# Patient Record
Sex: MALE | Race: WHITE | NOT HISPANIC OR LATINO
[De-identification: names, ages, dates, MRNs, and addresses within clinical notes are randomized per-mention and may not be internally consistent; named-entity substitution may affect disease eponyms.]

---

## 2021-03-16 PROBLEM — Z00.00 ENCOUNTER FOR PREVENTIVE HEALTH EXAMINATION: Status: ACTIVE | Noted: 2021-03-16

## 2021-03-22 ENCOUNTER — APPOINTMENT (OUTPATIENT)
Dept: SURGERY | Facility: CLINIC | Age: 46
End: 2021-03-22
Payer: COMMERCIAL

## 2021-03-22 VITALS
HEART RATE: 65 BPM | SYSTOLIC BLOOD PRESSURE: 124 MMHG | HEIGHT: 74 IN | WEIGHT: 250 LBS | BODY MASS INDEX: 32.08 KG/M2 | DIASTOLIC BLOOD PRESSURE: 82 MMHG

## 2021-03-22 DIAGNOSIS — Z78.9 OTHER SPECIFIED HEALTH STATUS: ICD-10-CM

## 2021-03-22 DIAGNOSIS — Z86.79 PERSONAL HISTORY OF OTHER DISEASES OF THE CIRCULATORY SYSTEM: ICD-10-CM

## 2021-03-22 DIAGNOSIS — F17.210 NICOTINE DEPENDENCE, CIGARETTES, UNCOMPLICATED: ICD-10-CM

## 2021-03-22 DIAGNOSIS — Z86.39 PERSONAL HISTORY OF OTHER ENDOCRINE, NUTRITIONAL AND METABOLIC DISEASE: ICD-10-CM

## 2021-03-22 DIAGNOSIS — K61.1 RECTAL ABSCESS: ICD-10-CM

## 2021-03-22 PROCEDURE — 99072 ADDL SUPL MATRL&STAF TM PHE: CPT

## 2021-03-22 PROCEDURE — 99243 OFF/OP CNSLTJ NEW/EST LOW 30: CPT

## 2021-03-22 NOTE — HISTORY OF PRESENT ILLNESS
[de-identified] : This is a 46 year  old patient who was referred by Dr. Venancio Wells with the chief complaint of having recurrent pilonidal cyst .  He reports having this condition for more than 20 years.  He denies any trauma to the area.   He denies any fever or  night sweats. Appetite is good and weight is stable.  He states that the he had Pilonidal cyst drained 20 years ago. he states that 1.5 month ago the cyst came back. he was prescribed antibiotics and it got better.  2 weeks ago  it got inflamed again and he had it I&d'd  He wants to know if it could  be surgically  removed.\par

## 2021-03-22 NOTE — PLAN
[FreeTextEntry1] : Mr. RICHARDS  was told significance of findings, options, risks and benefits were explained.    All surgical options were discussed including non-surgical treatments.  he was advised to see a colorectal surgeon. \par Patient advised to seek immediate medical attention with any acute change in symptoms or with the development of any new or worsening symptoms.  Patient's questions and concerns addressed to patient's satisfaction, and patient verbalized an understanding of the information discussed.\par \par

## 2021-03-22 NOTE — ASSESSMENT
[FreeTextEntry1] : Impression: PAtient is here with cc of recurrent pilonidal cyst. there is no sinuses that would suggest that he has a pilonidal cyst. he might have talita-rectal abscess. I was  unable to see the area where he had I&D.  no active infection

## 2021-03-22 NOTE — PHYSICAL EXAM
[Pilonidal Cyst] : no pilonidal cysts [External Hemorrhoid] : an external hemorrhoid was present [Skin Tags] : residual hemorrhoidal skin tags were noted [None] : there was no rectal mass  [de-identified] : He  is alert, well-groomed, and in NAD\par   [de-identified] : anicteric.  Nasal mucosa pink, septum midline. Oral mucosa pink.  Tongue midline, Pharynx without exudates.\par   [de-identified] : Neck supple. Trachea midline. Thyroid isthmus barely palpable, lobes not felt.  [de-identified] : unable to see the area where he had I&D

## 2021-03-22 NOTE — CONSULT LETTER
[Dear  ___] : Dear  [unfilled], [Consult Letter:] : I had the pleasure of evaluating your patient, [unfilled]. [Please see my note below.] : Please see my note below. [Consult Closing:] : Thank you very much for allowing me to participate in the care of this patient.  If you have any questions, please do not hesitate to contact me. [Sincerely,] : Sincerely, [FreeTextEntry3] : Dick Pacheco MD, FACS

## 2021-09-16 ENCOUNTER — APPOINTMENT (OUTPATIENT)
Dept: SURGERY | Facility: CLINIC | Age: 46
End: 2021-09-16
Payer: COMMERCIAL

## 2021-09-16 VITALS
SYSTOLIC BLOOD PRESSURE: 128 MMHG | HEART RATE: 69 BPM | BODY MASS INDEX: 32.08 KG/M2 | HEIGHT: 74 IN | WEIGHT: 250 LBS | OXYGEN SATURATION: 99 % | DIASTOLIC BLOOD PRESSURE: 77 MMHG

## 2021-09-16 VITALS — TEMPERATURE: 96.8 F

## 2021-09-16 DIAGNOSIS — K64.4 RESIDUAL HEMORRHOIDAL SKIN TAGS: ICD-10-CM

## 2021-09-16 DIAGNOSIS — L98.8 OTHER SPECIFIED DISORDERS OF THE SKIN AND SUBCUTANEOUS TISSUE: ICD-10-CM

## 2021-09-16 PROCEDURE — 99213 OFFICE O/P EST LOW 20 MIN: CPT | Mod: 25

## 2021-09-16 PROCEDURE — 46600 DIAGNOSTIC ANOSCOPY SPX: CPT

## 2021-09-16 RX ORDER — ROSUVASTATIN CALCIUM 20 MG/1
20 TABLET, FILM COATED ORAL
Qty: 90 | Refills: 0 | Status: ACTIVE | COMMUNITY
Start: 2021-03-16

## 2021-09-16 RX ORDER — HYDROCODONE BITARTRATE AND ACETAMINOPHEN 5; 325 MG/1; MG/1
5-325 TABLET ORAL
Qty: 9 | Refills: 0 | Status: COMPLETED | COMMUNITY
Start: 2021-03-02 | End: 2021-09-16

## 2021-09-16 RX ORDER — SULFAMETHOXAZOLE AND TRIMETHOPRIM 800; 160 MG/1; MG/1
800-160 TABLET ORAL
Qty: 20 | Refills: 0 | Status: COMPLETED | COMMUNITY
Start: 2021-03-02 | End: 2021-09-16

## 2021-09-16 RX ORDER — LOSARTAN POTASSIUM 100 MG/1
100 TABLET, FILM COATED ORAL
Qty: 90 | Refills: 0 | Status: ACTIVE | COMMUNITY
Start: 2021-03-16

## 2021-09-22 PROBLEM — K64.4 HEMORRHOIDS, EXTERNAL: Status: ACTIVE | Noted: 2021-09-22

## 2021-09-22 PROBLEM — L98.8 PILONIDAL DISEASE: Status: ACTIVE | Noted: 2021-09-22

## 2021-09-22 NOTE — HISTORY OF PRESENT ILLNESS
[FreeTextEntry1] : Mr. Keo Zaldivar is a 46y.o. M presenting for possible pilonidal disease. He reports that he first developed issues with pilonidal disease around the age of 22 and at that time it was actually on his tailbone. However, now the area bothering him is lower near his cleft and he has had 3 episodes this year. The first 2 episodes were managed w/ an I&D and the last episode, which occurred last week, drained spontaneously while at work. He also reports 2 anal skin tags and some internal hemorrhoids that bother him. He has daily BMs which vary in softness and need for straining depending on his diet. He is not on a fiber supplement or bowel regimen. Of note, his last colonoscopy was in 2006 which he had for bleeding at which time 2 benign polyps were found.

## 2021-09-22 NOTE — PHYSICAL EXAM
[Normal] : was normal [None] : there was no rectal mass  [Normal Breath Sounds] : Normal breath sounds [Normal Heart Sounds] : normal heart sounds [Normal Rate and Rhythm] : normal rate and rhythm [Alert] : alert [Oriented to Person] : oriented to person [Oriented to Place] : oriented to place [Oriented to Time] : oriented to time [Tender, Swollen] : nontender, non-swollen [Thrombosed] : that was not thrombosed [Gross Blood] : no gross blood [JVD] : no jugular venous distention  [Wheezing] : no wheezing was heard [de-identified] : soft, non-distended, non-tender to palpation [de-identified] : area at the top of the cleft towards the right side w/ some induration and erythema w/o fluctuance, no sinuses or pits typical for pilonidal seen, large external hemorrhoids w/o inflammation [de-identified] : awake, alert, in NAD [de-identified] : normocephalic, atraumatic, EOMI, nl conjunctiva [de-identified] : deferred [de-identified] : b/l chest rise, EWOB on RA [de-identified] : normal strength [de-identified] : perianal skin as above [de-identified] : normal mood and affect

## 2021-09-22 NOTE — ASSESSMENT
[FreeTextEntry1] : 46y.o. M w/ large external hemorrhoids and possible pilonidal disease vs abscess cavity.

## 2021-09-22 NOTE — PROCEDURE
[FreeTextEntry1] : Anoscopy - performed with small lighted disposable anoscope, unremarkable internal hemorrhoids noted, large non-inflamed external hemorrhoids (all 3 columns though LL and RA largest ones)

## 2021-11-12 DIAGNOSIS — Z01.818 ENCOUNTER FOR OTHER PREPROCEDURAL EXAMINATION: ICD-10-CM

## 2021-11-19 ENCOUNTER — OUTPATIENT (OUTPATIENT)
Dept: OUTPATIENT SERVICES | Facility: HOSPITAL | Age: 46
LOS: 1 days | End: 2021-11-19
Payer: COMMERCIAL

## 2021-11-19 ENCOUNTER — APPOINTMENT (OUTPATIENT)
Dept: DISASTER EMERGENCY | Facility: CLINIC | Age: 46
End: 2021-11-19

## 2021-11-19 VITALS
SYSTOLIC BLOOD PRESSURE: 143 MMHG | OXYGEN SATURATION: 100 % | RESPIRATION RATE: 16 BRPM | WEIGHT: 244.93 LBS | TEMPERATURE: 99 F | HEART RATE: 85 BPM | DIASTOLIC BLOOD PRESSURE: 86 MMHG | HEIGHT: 74 IN

## 2021-11-19 DIAGNOSIS — L98.8 OTHER SPECIFIED DISORDERS OF THE SKIN AND SUBCUTANEOUS TISSUE: ICD-10-CM

## 2021-11-19 DIAGNOSIS — K64.4 RESIDUAL HEMORRHOIDAL SKIN TAGS: ICD-10-CM

## 2021-11-19 DIAGNOSIS — Z29.9 ENCOUNTER FOR PROPHYLACTIC MEASURES, UNSPECIFIED: ICD-10-CM

## 2021-11-19 DIAGNOSIS — I10 ESSENTIAL (PRIMARY) HYPERTENSION: ICD-10-CM

## 2021-11-19 DIAGNOSIS — S13.160A: Chronic | ICD-10-CM

## 2021-11-19 DIAGNOSIS — Z01.818 ENCOUNTER FOR OTHER PREPROCEDURAL EXAMINATION: ICD-10-CM

## 2021-11-19 DIAGNOSIS — G47.33 OBSTRUCTIVE SLEEP APNEA (ADULT) (PEDIATRIC): ICD-10-CM

## 2021-11-19 LAB — SARS-COV-2 N GENE NPH QL NAA+PROBE: NOT DETECTED

## 2021-11-19 PROCEDURE — G0463: CPT

## 2021-11-19 PROCEDURE — 93005 ELECTROCARDIOGRAM TRACING: CPT

## 2021-11-19 RX ORDER — LOSARTAN POTASSIUM 100 MG/1
1 TABLET, FILM COATED ORAL
Qty: 0 | Refills: 0 | DISCHARGE

## 2021-11-19 NOTE — H&P PST ADULT - NSICDXPASTMEDICALHX_GEN_ALL_CORE_FT
PAST MEDICAL HISTORY:  HLD (hyperlipidemia)     HTN (hypertension)     Other specified disorders of the skin and subcutaneous tissue     Residual hemorrhoidal skin tags

## 2021-11-19 NOTE — H&P PST ADULT - PROBLEM SELECTOR PLAN 3
Pt denies sleep apnea or sleep studies. Stop Bang Score =3 Pt will be monitor for patent airway pre and post procedure

## 2021-11-19 NOTE — H&P PST ADULT - ATTENDING COMMENTS
OR today for EUA, pilonidal cystectomy, excisional hemorrhoidectomy  Prone jackknife  MAC for anesthesia  0.5% marcaine and exparel 1:1    Rolanda Godwin MD  Attending Physician

## 2021-11-19 NOTE — H&P PST ADULT - ASSESSMENT
46 yr old male with history of HTN, HLD presents with hemorrhoid and pilonidal cyst. Pt had the cyst and hemorrhoids for sometime. Pt had an I&D by his primary and the cyst returned within 2 months in October. Pt was referred to surgeon for evaluation. Pt schedule for exam under anesthesia excisional hemorrhoidectomy pilonidal cystectomy on 11/22/2021.

## 2021-11-19 NOTE — H&P PST ADULT - HISTORY OF PRESENT ILLNESS
46 yr old male with history of HTN, HLD, presents with hemorrhoids and pilonidal cyst for some time. Pt had PMD and had the cyst I&D July 2021. Pt states the cyst returned two months later and sent to surgeon for consultation. Pt schedule for exam under anesthesia excisional hemorrhoidectomy pilonidal cystectomy on 11/22/2021. Pt instructed on the chlorhexidene wash am of surgery. Written instructions provided verbalized understanding. Pt denies any sleep apnea or sleep studies.

## 2021-11-21 ENCOUNTER — TRANSCRIPTION ENCOUNTER (OUTPATIENT)
Age: 46
End: 2021-11-21

## 2021-11-22 ENCOUNTER — APPOINTMENT (OUTPATIENT)
Dept: SURGERY | Facility: HOSPITAL | Age: 46
End: 2021-11-22
Payer: COMMERCIAL

## 2021-11-22 ENCOUNTER — OUTPATIENT (OUTPATIENT)
Dept: OUTPATIENT SERVICES | Facility: HOSPITAL | Age: 46
LOS: 1 days | End: 2021-11-22
Payer: COMMERCIAL

## 2021-11-22 ENCOUNTER — RESULT REVIEW (OUTPATIENT)
Age: 46
End: 2021-11-22

## 2021-11-22 VITALS
HEART RATE: 85 BPM | RESPIRATION RATE: 16 BRPM | SYSTOLIC BLOOD PRESSURE: 105 MMHG | TEMPERATURE: 99 F | OXYGEN SATURATION: 98 % | WEIGHT: 244.93 LBS | DIASTOLIC BLOOD PRESSURE: 75 MMHG | HEIGHT: 74 IN

## 2021-11-22 VITALS
RESPIRATION RATE: 17 BRPM | DIASTOLIC BLOOD PRESSURE: 75 MMHG | OXYGEN SATURATION: 98 % | HEART RATE: 68 BPM | TEMPERATURE: 98 F | SYSTOLIC BLOOD PRESSURE: 122 MMHG

## 2021-11-22 DIAGNOSIS — K64.4 RESIDUAL HEMORRHOIDAL SKIN TAGS: ICD-10-CM

## 2021-11-22 DIAGNOSIS — L98.8 OTHER SPECIFIED DISORDERS OF THE SKIN AND SUBCUTANEOUS TISSUE: ICD-10-CM

## 2021-11-22 DIAGNOSIS — S13.160A: Chronic | ICD-10-CM

## 2021-11-22 PROCEDURE — 11770 EXC PILONIDAL CYST SIMPLE: CPT

## 2021-11-22 PROCEDURE — 88304 TISSUE EXAM BY PATHOLOGIST: CPT | Mod: 26

## 2021-11-22 PROCEDURE — 88304 TISSUE EXAM BY PATHOLOGIST: CPT

## 2021-11-22 PROCEDURE — 46260 REMOVE IN/EX HEM GROUPS 2+: CPT

## 2021-11-22 PROCEDURE — 46250 REMOVE EXT HEM GROUPS 2+: CPT

## 2021-11-22 RX ORDER — LOSARTAN POTASSIUM 100 MG/1
1 TABLET, FILM COATED ORAL
Qty: 0 | Refills: 0 | DISCHARGE

## 2021-11-22 RX ORDER — FENTANYL CITRATE 50 UG/ML
50 INJECTION INTRAVENOUS
Refills: 0 | Status: DISCONTINUED | OUTPATIENT
Start: 2021-11-22 | End: 2021-11-22

## 2021-11-22 RX ORDER — SODIUM CHLORIDE 9 MG/ML
1000 INJECTION, SOLUTION INTRAVENOUS
Refills: 0 | Status: DISCONTINUED | OUTPATIENT
Start: 2021-11-22 | End: 2021-11-22

## 2021-11-22 RX ORDER — SODIUM CHLORIDE 9 MG/ML
3 INJECTION INTRAMUSCULAR; INTRAVENOUS; SUBCUTANEOUS EVERY 8 HOURS
Refills: 0 | Status: DISCONTINUED | OUTPATIENT
Start: 2021-11-22 | End: 2021-11-22

## 2021-11-22 RX ORDER — OXYCODONE AND ACETAMINOPHEN 5; 325 MG/1; MG/1
1 TABLET ORAL ONCE
Refills: 0 | Status: DISCONTINUED | OUTPATIENT
Start: 2021-11-22 | End: 2021-11-22

## 2021-11-22 RX ORDER — OXYCODONE HYDROCHLORIDE 5 MG/1
1 TABLET ORAL
Qty: 12 | Refills: 0
Start: 2021-11-22

## 2021-11-22 RX ORDER — FENTANYL CITRATE 50 UG/ML
25 INJECTION INTRAVENOUS
Refills: 0 | Status: DISCONTINUED | OUTPATIENT
Start: 2021-11-22 | End: 2021-11-22

## 2021-11-22 RX ORDER — ROSUVASTATIN CALCIUM 5 MG/1
100 TABLET ORAL
Qty: 0 | Refills: 0 | DISCHARGE

## 2021-11-22 RX ADMIN — FENTANYL CITRATE 25 MICROGRAM(S): 50 INJECTION INTRAVENOUS at 13:20

## 2021-11-22 NOTE — BRIEF OPERATIVE NOTE - NSICDXBRIEFPREOP_GEN_ALL_CORE_FT
PRE-OP DIAGNOSIS:  Hemorrhoids 22-Nov-2021 12:39:59  Mayelin Weiner  Pilonidal cyst 22-Nov-2021 12:40:07  Mayelin Weiner

## 2021-11-22 NOTE — ASU DISCHARGE PLAN (ADULT/PEDIATRIC) - CARE PROVIDER_API CALL
Rolanda Godwin)  Surgery  [address] [facility] [address], [address]  [place] [address]  Phone: [phone]  Fax: [phone]  Follow Up Time: 2 weeks

## 2021-11-22 NOTE — BRIEF OPERATIVE NOTE - NSICDXBRIEFPOSTOP_GEN_ALL_CORE_FT
POST-OP DIAGNOSIS:  Pilonidal cyst 22-Nov-2021 12:40:19  Mayelin Weiner  Hemorrhoids 22-Nov-2021 12:40:12  Mayelin Weiner

## 2021-11-22 NOTE — BRIEF OPERATIVE NOTE - NSICDXBRIEFPROCEDURE_GEN_ALL_CORE_FT
PROCEDURES:  Excision, pilonidal sinus, simple 22-Nov-2021 12:38:22  Mayelin Weiner  External hemorrhoidectomy involving two or more anal columns 22-Nov-2021 12:39:44  Mayelin Weiner

## 2021-11-24 PROBLEM — K64.4 RESIDUAL HEMORRHOIDAL SKIN TAGS: Chronic | Status: ACTIVE | Noted: 2021-11-19

## 2021-11-24 PROBLEM — L98.8 OTHER SPECIFIED DISORDERS OF THE SKIN AND SUBCUTANEOUS TISSUE: Chronic | Status: ACTIVE | Noted: 2021-11-19

## 2021-11-24 PROBLEM — I10 ESSENTIAL (PRIMARY) HYPERTENSION: Chronic | Status: ACTIVE | Noted: 2021-11-19

## 2021-11-24 PROBLEM — E78.5 HYPERLIPIDEMIA, UNSPECIFIED: Chronic | Status: ACTIVE | Noted: 2021-11-19

## 2021-11-30 LAB — SURGICAL PATHOLOGY STUDY: SIGNIFICANT CHANGE UP

## 2021-12-02 ENCOUNTER — APPOINTMENT (OUTPATIENT)
Dept: SURGERY | Facility: CLINIC | Age: 46
End: 2021-12-02
Payer: COMMERCIAL

## 2021-12-02 VITALS
SYSTOLIC BLOOD PRESSURE: 136 MMHG | BODY MASS INDEX: 31.44 KG/M2 | DIASTOLIC BLOOD PRESSURE: 76 MMHG | HEIGHT: 74 IN | WEIGHT: 245 LBS | HEART RATE: 96 BPM

## 2021-12-02 VITALS — TEMPERATURE: 96.7 F

## 2021-12-02 PROCEDURE — 99024 POSTOP FOLLOW-UP VISIT: CPT

## 2021-12-22 NOTE — ASSESSMENT
[FreeTextEntry1] : 46y.o. M w/ pilonidal disease and large symptomatic external hemorrhoids now s/p EUA, pilonidal cystectomy and excisional hemorrhoidectomy (x3) on 11/22/21 presenting for a post-op visit.

## 2021-12-22 NOTE — HISTORY OF PRESENT ILLNESS
[FreeTextEntry1] : Mr. Keo Zaldivar is a 46y.o. M presenting for a post-op visit. He first developed issues with pilonidal disease around the age of 22 and at that time it was actually on his tailbone. However, now the area bothering him is lower near his cleft and he has had 3 episodes this year. The first 2 episodes were managed w/ an I&D and the last episode, which occurred last week, drained spontaneously while at work. He also reports 2 anal skin tags and some internal hemorrhoids that bother him. He has daily BMs which vary in softness and need for straining depending on his diet. He is not on a fiber supplement or bowel regimen. Of note, his last colonoscopy was in 2006 which he had for bleeding at which time 2 benign polyps were found. Took him to the OR on 11/22/21 for an EUA, pilonidal cystectomy and excisional hemorrhoidectomy (x3). Today he reports that he is starting to have some improvement in his pain and is not having much drainage. He denies any fevers, chills, issues with urination or issues with BMs.

## 2021-12-22 NOTE — PHYSICAL EXAM
[Normal Rate and Rhythm] : normal rate and rhythm [Alert] : alert [Oriented to Person] : oriented to person [Oriented to Place] : oriented to place [Oriented to Time] : oriented to time [Excoriation] : no perianal excoriation [Gross Blood] : no gross blood [JVD] : no jugular venous distention  [de-identified] : soft, non-distended, non-tender to palpation [de-identified] : excision sites healing well w/ expected drainage, vicryl visible (left in place to fall out and dissolve) [de-identified] : awake, alert, in NAD [de-identified] : normocephalic, atraumatic, EOMI, nl conjunctiva [de-identified] : b/l chest rise, EWOB on RA [de-identified] : deferred [de-identified] : normal strength [de-identified] : perianal skin as above [de-identified] : normal mood and affect

## 2023-03-21 NOTE — ASU PREOP CHECKLIST - BOWEL PREP
Do You Have A Family History Of Psoriasis?: no
Have You Been Diagnosed With Psoriatic Arthritis?: yes
Where Is Your Psoriasis Located?: Body
n/a

## 2023-05-22 NOTE — H&P PST ADULT - RS GEN PE MLT RESP DETAILS PC
normal/airway patent/breath sounds equal/good air movement/respirations non-labored Erythromycin Counseling:  I discussed with the patient the risks of erythromycin including but not limited to GI upset, allergic reaction, drug rash, diarrhea, increase in liver enzymes, and yeast infections.

## 2023-08-09 NOTE — ASU PATIENT PROFILE, ADULT - PT NEEDS ASSIST
Notified patient of results and is agreeable to plan.     Script for Metformin 500 mg BID sent to [facility].    He is aware to start medication after scheduled procedure.    no

## 2024-09-27 ENCOUNTER — APPOINTMENT (OUTPATIENT)
Dept: ORTHOPEDIC SURGERY | Facility: CLINIC | Age: 49
End: 2024-09-27
Payer: COMMERCIAL

## 2024-09-27 DIAGNOSIS — Z00.00 ENCOUNTER FOR GENERAL ADULT MEDICAL EXAMINATION W/OUT ABNORMAL FINDINGS: ICD-10-CM

## 2024-09-27 DIAGNOSIS — M51.36 OTHER INTERVERTEBRAL DISC DEGENERATION, LUMBAR REGION: ICD-10-CM

## 2024-09-27 PROCEDURE — 99204 OFFICE O/P NEW MOD 45 MIN: CPT

## 2024-09-27 PROCEDURE — 72170 X-RAY EXAM OF PELVIS: CPT

## 2024-09-27 PROCEDURE — 72110 X-RAY EXAM L-2 SPINE 4/>VWS: CPT

## 2024-09-27 RX ORDER — METHYLPREDNISOLONE 4 MG/1
4 TABLET ORAL
Qty: 1 | Refills: 0 | Status: ACTIVE | COMMUNITY
Start: 2024-09-27 | End: 1900-01-01

## 2024-09-27 RX ORDER — MELOXICAM 15 MG/1
15 TABLET ORAL
Qty: 30 | Refills: 0 | Status: ACTIVE | COMMUNITY
Start: 2024-09-27 | End: 1900-01-01

## 2024-09-27 RX ORDER — CYCLOBENZAPRINE HYDROCHLORIDE 5 MG/1
5 TABLET, FILM COATED ORAL
Qty: 45 | Refills: 1 | Status: ACTIVE | COMMUNITY
Start: 2024-09-27 | End: 1900-01-01

## 2024-09-27 NOTE — IMAGING
[de-identified] : LSPINE Inspection: No rash or ecchymosis Palpation: No tenderness to palpation or spasm in bilateral thoracic and lumbar paraspinal musculature, no SI joint tenderness to palpation ROM: Full with stiffness Strength: 5/5 bilateral hip flexors, knee extensors, ankle dorsiflexors, EHL, ankle plantarflexors Sensation: Sensation present to light touch bilateral L2-S1 distributions Provocative maneuvers: Negative bilateral straight leg raise [Facet arthropathy] : Facet arthropathy [Disc space narrowing] : Disc space narrowing [AP] : anteroposterior [There are no fractures, subluxations or dislocations. No significant abnormalities are seen] : There are no fractures, subluxations or dislocations. No significant abnormalities are seen

## 2024-09-27 NOTE — ASSESSMENT
[FreeTextEntry1] : MDP NSAIDs- Patient warned of risk of medication to GI tract, increased blood pressure, cardiac risk, and risk of fluid retention.  Advised to clear medication with internist or PCP if any concurrent health problem with heart, blood pressure, or GI system exists. Muscle Relaxants- To help decrease muscle spasm and assist with pain relief. Advised of sedating effects and instructed not to drive, operate heavy machinery, or take with other sedating medications. PT, meds  Will discuss MRI

## 2024-09-27 NOTE — HISTORY OF PRESENT ILLNESS
[de-identified] : 49-year-old male presents today with complaints of sharp lower back. Patient denies acute trauma or injury. Has been taking aleve, performing HEP, utilizing warm compresses and resting with some improvement. Patient reports symptoms flare with activity.  Denies radicular pain, n/t. Denies change in bb function. Patient was evaluated for low back pain in past by Dr. Morataya in May 2021, treatment with PT.   PmHx: HTN NKDA

## 2024-10-24 ENCOUNTER — APPOINTMENT (OUTPATIENT)
Dept: ORTHOPEDIC SURGERY | Facility: CLINIC | Age: 49
End: 2024-10-24
Payer: COMMERCIAL

## 2024-10-24 DIAGNOSIS — M54.16 RADICULOPATHY, LUMBAR REGION: ICD-10-CM

## 2024-10-24 PROCEDURE — 99215 OFFICE O/P EST HI 40 MIN: CPT

## 2024-10-24 RX ORDER — GABAPENTIN 100 MG/1
100 CAPSULE ORAL
Qty: 60 | Refills: 2 | Status: ACTIVE | COMMUNITY
Start: 2024-10-24 | End: 1900-01-01

## 2024-11-01 ENCOUNTER — APPOINTMENT (OUTPATIENT)
Dept: MRI IMAGING | Facility: CLINIC | Age: 49
End: 2024-11-01

## 2024-11-13 ENCOUNTER — APPOINTMENT (OUTPATIENT)
Dept: ORTHOPEDIC SURGERY | Facility: CLINIC | Age: 49
End: 2024-11-13

## 2024-11-14 ENCOUNTER — APPOINTMENT (OUTPATIENT)
Dept: ORTHOPEDIC SURGERY | Facility: CLINIC | Age: 49
End: 2024-11-14
Payer: COMMERCIAL

## 2024-11-14 DIAGNOSIS — M54.16 RADICULOPATHY, LUMBAR REGION: ICD-10-CM

## 2024-11-14 PROCEDURE — 99214 OFFICE O/P EST MOD 30 MIN: CPT

## 2024-12-19 ENCOUNTER — APPOINTMENT (OUTPATIENT)
Dept: ORTHOPEDIC SURGERY | Facility: CLINIC | Age: 49
End: 2024-12-19
Payer: COMMERCIAL

## 2024-12-19 PROCEDURE — 99215 OFFICE O/P EST HI 40 MIN: CPT

## 2024-12-27 PROBLEM — M51.369 DDD (DEGENERATIVE DISC DISEASE), LUMBAR: Status: ACTIVE | Noted: 2024-09-27

## 2024-12-30 ENCOUNTER — APPOINTMENT (OUTPATIENT)
Dept: PAIN MANAGEMENT | Facility: CLINIC | Age: 49
End: 2024-12-30
Payer: OTHER MISCELLANEOUS

## 2025-01-07 ENCOUNTER — APPOINTMENT (OUTPATIENT)
Dept: PAIN MANAGEMENT | Facility: CLINIC | Age: 50
End: 2025-01-07
Payer: OTHER MISCELLANEOUS

## 2025-01-07 VITALS — BODY MASS INDEX: 33.88 KG/M2 | WEIGHT: 264 LBS | HEIGHT: 74 IN

## 2025-01-07 DIAGNOSIS — M51.369: ICD-10-CM

## 2025-01-07 DIAGNOSIS — M54.16 RADICULOPATHY, LUMBAR REGION: ICD-10-CM

## 2025-01-07 PROCEDURE — 99204 OFFICE O/P NEW MOD 45 MIN: CPT

## 2025-04-08 ENCOUNTER — APPOINTMENT (OUTPATIENT)
Dept: PAIN MANAGEMENT | Facility: CLINIC | Age: 50
End: 2025-04-08
Payer: OTHER MISCELLANEOUS

## 2025-04-08 PROCEDURE — 62323 NJX INTERLAMINAR LMBR/SAC: CPT

## 2025-04-21 ENCOUNTER — APPOINTMENT (OUTPATIENT)
Dept: PAIN MANAGEMENT | Facility: CLINIC | Age: 50
End: 2025-04-21
Payer: OTHER MISCELLANEOUS

## 2025-04-21 VITALS — WEIGHT: 253 LBS | HEIGHT: 74 IN | BODY MASS INDEX: 32.47 KG/M2

## 2025-04-21 DIAGNOSIS — M51.369: ICD-10-CM

## 2025-04-21 DIAGNOSIS — M54.16 RADICULOPATHY, LUMBAR REGION: ICD-10-CM

## 2025-04-21 PROCEDURE — 99214 OFFICE O/P EST MOD 30 MIN: CPT

## 2025-05-29 ENCOUNTER — APPOINTMENT (OUTPATIENT)
Dept: ORTHOPEDIC SURGERY | Facility: CLINIC | Age: 50
End: 2025-05-29

## 2025-05-29 PROCEDURE — 99214 OFFICE O/P EST MOD 30 MIN: CPT

## 2025-06-02 ENCOUNTER — APPOINTMENT (OUTPATIENT)
Dept: PAIN MANAGEMENT | Facility: CLINIC | Age: 50
End: 2025-06-02
Payer: OTHER MISCELLANEOUS

## 2025-06-02 VITALS — BODY MASS INDEX: 32.47 KG/M2 | WEIGHT: 253 LBS | HEIGHT: 74 IN

## 2025-06-02 DIAGNOSIS — M54.16 RADICULOPATHY, LUMBAR REGION: ICD-10-CM

## 2025-06-02 DIAGNOSIS — M51.369: ICD-10-CM

## 2025-06-02 PROCEDURE — 99214 OFFICE O/P EST MOD 30 MIN: CPT

## 2025-07-08 ENCOUNTER — APPOINTMENT (OUTPATIENT)
Dept: PAIN MANAGEMENT | Facility: CLINIC | Age: 50
End: 2025-07-08
Payer: OTHER MISCELLANEOUS

## 2025-07-08 PROCEDURE — 62323 NJX INTERLAMINAR LMBR/SAC: CPT

## 2025-07-21 ENCOUNTER — APPOINTMENT (OUTPATIENT)
Dept: PAIN MANAGEMENT | Facility: CLINIC | Age: 50
End: 2025-07-21
Payer: OTHER MISCELLANEOUS

## 2025-07-21 VITALS — WEIGHT: 250 LBS | HEIGHT: 74 IN | BODY MASS INDEX: 32.08 KG/M2

## 2025-07-21 DIAGNOSIS — M54.16 RADICULOPATHY, LUMBAR REGION: ICD-10-CM

## 2025-07-21 DIAGNOSIS — M51.369: ICD-10-CM

## 2025-07-21 PROCEDURE — 99214 OFFICE O/P EST MOD 30 MIN: CPT
